# Patient Record
Sex: FEMALE | Race: WHITE
[De-identification: names, ages, dates, MRNs, and addresses within clinical notes are randomized per-mention and may not be internally consistent; named-entity substitution may affect disease eponyms.]

---

## 2018-12-05 ENCOUNTER — HOSPITAL ENCOUNTER (EMERGENCY)
Dept: HOSPITAL 53 - M ED | Age: 23
Discharge: HOME | End: 2018-12-05
Payer: COMMERCIAL

## 2018-12-05 DIAGNOSIS — J02.0: Primary | ICD-10-CM

## 2018-12-05 DIAGNOSIS — M79.7: ICD-10-CM

## 2018-12-05 DIAGNOSIS — E03.9: ICD-10-CM

## 2018-12-05 DIAGNOSIS — Z79.890: ICD-10-CM

## 2018-12-05 DIAGNOSIS — Z88.8: ICD-10-CM

## 2018-12-05 PROCEDURE — 87880 STREP A ASSAY W/OPTIC: CPT

## 2020-11-13 ENCOUNTER — HOSPITAL ENCOUNTER (OUTPATIENT)
Dept: HOSPITAL 53 - M RAD | Age: 25
End: 2020-11-13
Attending: SURGERY
Payer: COMMERCIAL

## 2020-11-13 DIAGNOSIS — R10.84: Primary | ICD-10-CM

## 2020-11-13 PROCEDURE — 74176 CT ABD & PELVIS W/O CONTRAST: CPT

## 2020-11-13 NOTE — REP
INDICATION:

GENERALIZED ABD PAIN.



COMPARISON:

None



TECHNIQUE:

Secondary to the inability to establish and maintain intravenous access, the

examination was done without intravenous contrast.



FINDINGS:

The lung bases are clear.



Limited evaluation of the solid intra-organs the gallbladder show no gross

abnormalities.  Limited evaluation of the pancreas, adrenal glands, and kidneys show

no gross abnormalities.  There is no nephroureterolithiasis, hydronephrosis, or

hydroureter.  There are no urinary bladder calcifications.  Limited evaluation of the

abdominal aorta and para-aortic regions show no gross abnormalities.  The bowel loops

and the mesenteries are within normal limits.  There is no evidence of free

intraperitoneal air.  There is a small amount of free pelvic fluid which is likely

physiologic.



Bone window technique throughout the examination shows the osseous structures to be

within normal limits.



IMPRESSION:

There is no evidence of acute intraabdominal or intrapelvic disease.





<Electronically signed by Nolan Anton > 11/13/20 [id number]

## 2020-11-28 ENCOUNTER — HOSPITAL ENCOUNTER (OUTPATIENT)
Dept: HOSPITAL 53 - M LABSMTC | Age: 25
End: 2020-11-28
Attending: ANESTHESIOLOGY
Payer: COMMERCIAL

## 2020-11-28 DIAGNOSIS — Z20.828: ICD-10-CM

## 2020-11-28 DIAGNOSIS — Z01.812: Primary | ICD-10-CM

## 2020-12-02 ENCOUNTER — HOSPITAL ENCOUNTER (OUTPATIENT)
Dept: HOSPITAL 53 - M OPP | Age: 25
Discharge: HOME | End: 2020-12-02
Attending: SURGERY
Payer: COMMERCIAL

## 2020-12-02 VITALS — HEIGHT: 63 IN | BODY MASS INDEX: 37.56 KG/M2 | WEIGHT: 212 LBS

## 2020-12-02 VITALS — SYSTOLIC BLOOD PRESSURE: 133 MMHG | DIASTOLIC BLOOD PRESSURE: 87 MMHG

## 2020-12-02 DIAGNOSIS — M79.7: ICD-10-CM

## 2020-12-02 DIAGNOSIS — Z79.899: ICD-10-CM

## 2020-12-02 DIAGNOSIS — K29.50: ICD-10-CM

## 2020-12-02 DIAGNOSIS — Z88.8: ICD-10-CM

## 2020-12-02 DIAGNOSIS — R10.84: ICD-10-CM

## 2020-12-02 DIAGNOSIS — K21.00: Primary | ICD-10-CM

## 2020-12-02 DIAGNOSIS — F17.210: ICD-10-CM

## 2020-12-02 DIAGNOSIS — Z88.3: ICD-10-CM

## 2020-12-02 DIAGNOSIS — Z91.048: ICD-10-CM

## 2020-12-02 PROCEDURE — 45380 COLONOSCOPY AND BIOPSY: CPT

## 2020-12-02 PROCEDURE — 88305 TISSUE EXAM BY PATHOLOGIST: CPT

## 2020-12-02 PROCEDURE — 43239 EGD BIOPSY SINGLE/MULTIPLE: CPT

## 2020-12-02 NOTE — ROOR
________________________________________________________________________________

Patient Name: Mario Paiz              Procedure Date: 12/2/2020 10:12 AM

MRN: [id number]                          Account Number: [id number]

YOB: 1995               Age: 25

Room: [facility]                            Gender: Female

Note Status: Finalized                 

________________________________________________________________________________

 

Procedure:            Upper GI endoscopy

Indications:          Generalized abdominal pain

Providers:            Geovany Ramires MD

Referring MD:         Coleman ORELLANA NP

Requesting Provider:  

Medicines:            Monitored Anesthesia Care

Complications:        No immediate complications.

________________________________________________________________________________

Procedure:            Pre-Anesthesia Assessment:

                      - Prior to the procedure, a History and Physical was 

                      performed, and patient medications and allergies were 

                      reviewed. The patient is competent. The risks and 

                      benefits of the procedure and the sedation options and 

                      risks were discussed with the patient. All questions 

                      were answered and informed consent was obtained. Patient 

                      identification and proposed procedure were verified by 

                      the physician, the nurse and the anesthetist in the 

                      endoscopy suite. Mental Status Examination: alert and 

                      oriented. Airway Examination: normal oropharyngeal 

                      airway and neck mobility. Respiratory Examination: clear 

                      to auscultation. CV Examination: normal. Prophylactic 

                      Antibiotics: The patient does not require prophylactic 

                      antibiotics. Prior Anticoagulants: The patient has taken 

                      no previous anticoagulant or antiplatelet agents. ASA 

                      Grade Assessment: I - A normal, healthy patient. After 

                      reviewing the risks and benefits, the patient was deemed 

                      in satisfactory condition to undergo the procedure. The 

                      anesthesia plan was to use moderate sedation / analgesia 

                      (conscious sedation). Immediately prior to 

                      administration of medications, the patient was 

                      re-assessed for adequacy to receive sedatives. The heart 

                      rate, respiratory rate, oxygen saturations, blood 

                      pressure, adequacy of pulmonary ventilation, and 

                      response to care were monitored throughout the 

                      procedure. The physical status of the patient was 

                      re-assessed after the procedure.

                      The Endoscope was introduced through the mouth, and 

                      advanced to the second part of duodenum. The upper GI 

                      endoscopy was accomplished without difficulty. The 

                      patient tolerated the procedure well.

                                                                                

Findings:

     LA Grade A (one or more mucosal breaks less than 5 mm, not extending 

     between tops of 2 mucosal folds) esophagitis with no bleeding was found 

     34 to 38 cm from the incisors. This was biopsied with a cold forceps for 

     histology and Helicobacter pylori testing.

     Localized mild inflammation characterized by erythema was found in the 

     prepyloric region of the stomach. Biopsies were taken with a cold forceps 

     for Helicobacter pylori testing.

     The duodenal bulb, first portion of the duodenum and second portion of 

     the duodenum were normal.

                                                                                

Impression:           - LA Grade A reflux esophagitis. Rule out Silva's 

                      esophagus. Biopsied.

                      - Chronic gastritis. Biopsied.

                      - Normal duodenal bulb, first portion of the duodenum 

                      and second portion of the duodenum.

Recommendation:       - Discharge patient to home (ambulatory).

                      - Use Prilosec (omeprazole) 40 mg PO daily for 6 weeks.

                                                                                

Procedure Code(s):    --- Professional ---

                      26179, Esophagogastroduodenoscopy, flexible, transoral; 

                      with biopsy, single or multiple

Diagnosis Code(s):    --- Professional ---

                      K21.0, Gastro-esophageal reflux disease with esophagitis

                      K29.50, Unspecified chronic gastritis without bleeding

                      R10.84, Generalized abdominal pain

 

CPT copyright 2019 [employer]. All rights reserved.

 

The codes documented in this report are preliminary and upon  review may 

be revised to meet current compliance requirements.

 

Geovany Ramires MD

_______________________

Geovany Ramires MD

12/2/2020 11:11:57 AM

Electronically signed by Geovany Ramires MD

Number of Addenda: 0

 

Note Initiated On: 12/2/2020 10:12 AM

Estimated Blood Loss: Estimated blood loss: none.

## 2020-12-02 NOTE — ROOR
________________________________________________________________________________

Patient Name: Mario Paiz              Procedure Date: 12/2/2020 10:13 AM

MRN: [id number]                          Account Number: [id number]

YOB: 1995               Age: 25

Room: [facility]                            Gender: Female

Note Status: Finalized                 

________________________________________________________________________________

 

Procedure:            Colonoscopy

Indications:          Generalized abdominal pain

Providers:            Geovany Ramires MD

Referring MD:         Coleman ORELLANA NP

Requesting Provider:  

Medicines:            Monitored Anesthesia Care

Complications:        No immediate complications.

________________________________________________________________________________

Procedure:            Pre-Anesthesia Assessment:

                      - Prior to the procedure, a History and Physical was 

                      performed, and patient medications and allergies were 

                      reviewed. The patient is competent. The risks and 

                      benefits of the procedure and the sedation options and 

                      risks were discussed with the patient. All questions 

                      were answered and informed consent was obtained. Patient 

                      identification and proposed procedure were verified by 

                      the physician, the nurse and the anesthetist in the 

                      endoscopy suite. Mental Status Examination: alert and 

                      oriented. Airway Examination: normal oropharyngeal 

                      airway and neck mobility. Respiratory Examination: clear 

                      to auscultation. CV Examination: normal. Prophylactic 

                      Antibiotics: The patient does not require prophylactic 

                      antibiotics. Prior Anticoagulants: The patient has taken 

                      no previous anticoagulant or antiplatelet agents. ASA 

                      Grade Assessment: I - A normal, healthy patient. After 

                      reviewing the risks and benefits, the patient was deemed 

                      in satisfactory condition to undergo the procedure. The 

                      anesthesia plan was to use monitored anesthesia care 

                      (MAC). Immediately prior to administration of 

                      medications, the patient was re-assessed for adequacy to 

                      receive sedatives. The heart rate, respiratory rate, 

                      oxygen saturations, blood pressure, adequacy of 

                      pulmonary ventilation, and response to care were 

                      monitored throughout the procedure. The physical status 

                      of the patient was re-assessed after the procedure.

                      The Colonoscope was introduced through the anus and 

                      advanced to the cecum, identified by appendiceal orifice 

                      and ileocecal valve. The colonoscopy was performed 

                      without difficulty. The patient tolerated the procedure 

                      well. The quality of the bowel preparation was good.

                                                                                

Findings:

     The perianal and digital rectal examinations were normal.

     The colon (entire examined portion) appeared normal. Biopsies for 

     histology were taken with a cold forceps from the entire colon for 

     evaluation of microscopic colitis.

     No additional abnormalities were found on retroflexion.

                                                                                

Impression:           - The entire examined colon is normal. Biopsied.

Recommendation:       - Discharge patient to home (ambulatory).

                      - Telephone my office for pathology results in 1 week.

                                                                                

Procedure Code(s):    --- Professional ---

                      79393, Colonoscopy, flexible; with biopsy, single or 

                      multiple

Diagnosis Code(s):    --- Professional ---

                      R10.84, Generalized abdominal pain

 

CPT copyright 2019 [employer]. All rights reserved.

 

The codes documented in this report are preliminary and upon  review may 

be revised to meet current compliance requirements.

 

Geovany Ramires MD

_______________________

Geovany Ramires MD

12/2/2020 11:19:23 AM

Electronically signed by Geovany Ramires MD

Number of Addenda: 0

 

Note Initiated On: 12/2/2020 10:13 AM

Estimated Blood Loss: Estimated blood loss was minimal.